# Patient Record
Sex: FEMALE | Race: WHITE | Employment: FULL TIME | URBAN - METROPOLITAN AREA
[De-identification: names, ages, dates, MRNs, and addresses within clinical notes are randomized per-mention and may not be internally consistent; named-entity substitution may affect disease eponyms.]

---

## 2019-10-31 ENCOUNTER — HOSPITAL ENCOUNTER (OUTPATIENT)
Dept: INTERVENTIONAL RADIOLOGY/VASCULAR | Age: 34
Discharge: HOME | End: 2019-10-31
Attending: NEUROLOGICAL SURGERY
Payer: COMMERCIAL

## 2019-10-31 VITALS
RESPIRATION RATE: 16 BRPM | HEART RATE: 101 BPM | BODY MASS INDEX: 48.82 KG/M2 | HEIGHT: 65 IN | DIASTOLIC BLOOD PRESSURE: 73 MMHG | SYSTOLIC BLOOD PRESSURE: 119 MMHG | TEMPERATURE: 98.7 F | WEIGHT: 293 LBS

## 2019-10-31 DIAGNOSIS — G93.2 BENIGN INTRACRANIAL HYPERTENSION: ICD-10-CM

## 2019-10-31 LAB
APPEARANCE FLD: NORMAL
COLOR FLD: NORMAL
GLUCOSE CSF-MCNC: 57 MG/DL (ref 40–70)
NUC CELL # FLD: 9 /CU MM
PROT CSF-MCNC: 38 MG/DL (ref 15–45)
RBC # FLD: 1661 /CU MM
SPECIMEN SOURCE FLD: NORMAL
TUBE # CSF: NORMAL
TUBE # CSF: NORMAL

## 2019-10-31 PROCEDURE — 89050 BODY FLUID CELL COUNT: CPT

## 2019-10-31 PROCEDURE — 77030003666 HC NDL SPINAL BD -A

## 2019-10-31 PROCEDURE — 84157 ASSAY OF PROTEIN OTHER: CPT

## 2019-10-31 PROCEDURE — 62270 DX LMBR SPI PNXR: CPT

## 2019-10-31 PROCEDURE — 74011000250 HC RX REV CODE- 250: Performed by: RADIOLOGY

## 2019-10-31 PROCEDURE — 82945 GLUCOSE OTHER FLUID: CPT

## 2019-10-31 PROCEDURE — 77030014143 HC TY PUNC LUMBR BD -A

## 2019-10-31 RX ORDER — LISINOPRIL AND HYDROCHLOROTHIAZIDE 10; 12.5 MG/1; MG/1
1 TABLET ORAL DAILY
COMMUNITY

## 2019-10-31 RX ORDER — LEVOCETIRIZINE DIHYDROCHLORIDE 5 MG/1
5 TABLET, FILM COATED ORAL DAILY
COMMUNITY

## 2019-10-31 RX ORDER — CHOLECALCIFEROL (VITAMIN D3) 125 MCG
2000 CAPSULE ORAL DAILY
COMMUNITY

## 2019-10-31 RX ORDER — LEVOTHYROXINE SODIUM 25 UG/1
25 TABLET ORAL
COMMUNITY

## 2019-10-31 RX ORDER — MONTELUKAST SODIUM 10 MG/1
10 TABLET ORAL DAILY
COMMUNITY

## 2019-10-31 RX ORDER — FLUTICASONE PROPIONATE 50 MCG
2 SPRAY, SUSPENSION (ML) NASAL DAILY
COMMUNITY

## 2019-10-31 RX ORDER — LIDOCAINE HYDROCHLORIDE 20 MG/ML
20-200 INJECTION, SOLUTION INFILTRATION; PERINEURAL ONCE
Status: COMPLETED | OUTPATIENT
Start: 2019-10-31 | End: 2019-10-31

## 2019-10-31 RX ORDER — AZELASTINE HCL 205.5 UG/1
1 SPRAY NASAL 2 TIMES DAILY
COMMUNITY

## 2019-10-31 RX ORDER — RANITIDINE 150 MG/1
150 TABLET, FILM COATED ORAL 2 TIMES DAILY
COMMUNITY
End: 2020-05-26

## 2019-10-31 RX ORDER — SPIRONOLACTONE 100 MG/1
50 TABLET, FILM COATED ORAL DAILY
COMMUNITY
End: 2019-11-13

## 2019-10-31 RX ORDER — BUPROPION HYDROCHLORIDE 75 MG/1
75 TABLET ORAL DAILY
COMMUNITY

## 2019-10-31 RX ORDER — BUTALBITAL, ACETAMINOPHEN, CAFFEINE AND CODEINE PHOSPHATE 50; 325; 40; 30 MG/1; MG/1; MG/1; MG/1
1 CAPSULE ORAL
COMMUNITY
End: 2019-11-13

## 2019-10-31 RX ORDER — DULOXETIN HYDROCHLORIDE 20 MG/1
20 CAPSULE, DELAYED RELEASE ORAL DAILY
COMMUNITY

## 2019-10-31 RX ADMIN — LIDOCAINE HYDROCHLORIDE 200 MG: 20 INJECTION, SOLUTION INFILTRATION; PERINEURAL at 08:24

## 2019-10-31 NOTE — PROGRESS NOTES
IR Nurse Pre-Procedure Checklist Part 2    Patient in IR suite at 1332      Consent signed: Yes    H&P complete:  Not applicable    Antibiotics: Not applicable    Airway/Mallampati Done: Not applicable    Shaved: Not applicable    Pregnancy Form:Yes    Patient Position: Yes    MD Side: Yes     Biopsy Worksheet: Yes    Specimen Medium: Not applicable

## 2019-10-31 NOTE — DISCHARGE INSTRUCTIONS
[address]  Department of Interventional Radiology  [facility]  [phone] Office  [phone] Fax  POST LUMBAR PUNCTURE/MYELOGRAM/INTRATHECAL CHEMOTHERAPY DISCHARGE INSTRUCTIONS  General Information:  Lumbar Puncture: A LP is done to help diagnose several disorders, like pseudo tumor, migraines, meningitis, and multiple sclerosis. It involves a puncture (usually in the lower spine) into the sac that protects the spinal column. A sample of the fluid in that space is removed and tested in the lab. Myelogram:   A Myelogram involves a lumbar puncture, and instead of removing fluid, contrast will be injected into the sac surrounding the spinal column. It is done to visualize the spinal column, nerve roots, spinal canal, vertebral discs and disc space. It is usually done to diagnose back pain with unknown cause or in preparation for surgery. After the injection, a CT scan will be done, usually within two hours of the injection. Intrathecal Chemotherapy:   Chemotherapy can be given in many forms. Intrathecal chemo involves a lumbar puncture, and instead of removing fluid, the chemo will be injected into the space. After any of these procedures, you will be asked to lie flat on your back for 4-6 hours to prevent complications. You should also rest for 24 hours after you go home, and force fluids. If you have a headache, you should take Tylenol or acetaminophen. Call If:   You should call your Physician and/or the Radiology Nurse if you develop a headache that is not relieved by Tylenol, and worsens when you stand and eases when you lie down, you need to call. You may have developed what is referred to as a spinal headache. Our physicians will probably advise you to be on strict bed rest for 24 hours, to drink lots of fluids and caffeine. If this does not help the head pain, call again the next day.  You should call if you have bleeding other than a small spot on your bandage. You should call if you have any numbness, tingling, weakness, fever, chills, urinary retention, severe itching, rash, welts, swelling, or confusion. Interventional Radiology General Nurse Discharge    After general anesthesia or intravenous sedation, for 24 hours or while taking prescription Narcotics:  · Limit your activities  · Do not drive and operate hazardous machinery  · Do not make important personal or business decisions  · Do  not drink alcoholic beverages  · If you have not urinated within 8 hours after discharge, please contact your surgeon on call. * Please give a list of your current medications to your Primary Care Provider. * Please update this list whenever your medications are discontinued, doses are     changed, or new medications (including over-the-counter products) are added. * Please carry medication information at all times in case of emergency situations. These are general instructions for a healthy lifestyle:    No smoking/ No tobacco products/ Avoid exposure to second hand smoke  Surgeon General's Warning:  Quitting smoking now greatly reduces serious risk to your health. Obesity, smoking, and sedentary lifestyle greatly increases your risk for illness  A healthy diet, regular physical exercise & weight monitoring are important for maintaining a healthy lifestyle    You may be retaining fluid if you have a history of heart failure or if you experience any of the following symptoms:  Weight gain of 3 pounds or more overnight or 5 pounds in a week, increased swelling in our hands or feet or shortness of breath while lying flat in bed. Please call your doctor as soon as you notice any of these symptoms; do not wait until your next office visit.     Recognize signs and symptoms of STROKE:  F-face looks uneven    A-arms unable to move or move unevenly    S-speech slurred or non-existent    T-time-call 911 as soon as signs and symptoms begin-DO NOT go       Back to bed or wait to see if you get better-TIME IS BRAIN. Follow-up Instructions: See the doctor who ordered your procedure as he/she has instructed. If you had a Lumbar Puncture or Myelogram, your results should be available to your ordering doctor in 3-5 business days. You can remove your dressing in 24 hours and shower regularly. Do not bathe or swim for 72 hours. To Reach Us: If you have any questions about your procedure, please call the Interventional Radiology department at [phone]. After business hours (5pm) and weekends, call the answering service at [phone] and ask for the Radiologist on call to be paged. Si tiene Preguntas acerca del procedimiento, por favor llame al departamento de Radiología Intervencional al [phone]. Después de horas de oficina (5 pm) y los fines de [place], llamar al Timothy Chawla al [phone] y pregunte por el Radiologo de [facility].      Patient Signature:  Date: 10/31/2019  Discharging Nurse: Jonathon Boyd RN

## 2019-10-31 NOTE — PROGRESS NOTES
Interventional Radiology Lumbar Puncture Note    10/31/2019    Procedure(s): Fluoroscopic guided diagnostic lumbar puncture performed at L3-L4 Approximately 22 cc of fluid. Opening pressures 27cm H2O. Closing pressure less than 15 cm of H20    CSF samples sent to lab. Complications: None    Plan: Observation,  bed rest for 2 hours. Patient to remain recumbent for the remainder of today.     Labs Pending:    Full dictated report to follow

## 2019-11-13 PROBLEM — E66.01 OBESITY, MORBID (HCC): Status: ACTIVE | Noted: 2019-11-13

## 2021-01-08 ENCOUNTER — HOSPITAL ENCOUNTER (OUTPATIENT)
Dept: GENERAL RADIOLOGY | Age: 36
Discharge: HOME | End: 2021-01-08
Payer: COMMERCIAL

## 2021-01-08 DIAGNOSIS — M25.69 BACK STIFFNESS: ICD-10-CM

## 2021-01-08 PROCEDURE — 72070 X-RAY EXAM THORAC SPINE 2VWS: CPT

## 2021-01-08 PROCEDURE — 72202 X-RAY EXAM SI JOINTS 3/> VWS: CPT

## 2022-03-18 PROBLEM — E66.01 OBESITY, MORBID (HCC): Status: ACTIVE | Noted: 2019-11-13

## 2022-09-08 ENCOUNTER — TELEMEDICINE (OUTPATIENT)
Dept: NEUROLOGY | Age: 37
End: 2022-09-08
Payer: COMMERCIAL

## 2022-09-08 DIAGNOSIS — G43.709 CHRONIC MIGRAINE WITHOUT AURA WITHOUT STATUS MIGRAINOSUS, NOT INTRACTABLE: ICD-10-CM

## 2022-09-08 DIAGNOSIS — G93.2 PSEUDOTUMOR CEREBRI: Primary | ICD-10-CM

## 2022-09-08 PROCEDURE — 99214 OFFICE O/P EST MOD 30 MIN: CPT | Performed by: PSYCHIATRY & NEUROLOGY

## 2022-09-08 ASSESSMENT — ENCOUNTER SYMPTOMS
GASTROINTESTINAL NEGATIVE: 1
RESPIRATORY NEGATIVE: 1

## 2022-09-08 NOTE — PROGRESS NOTES
[address], [address]  [place], [address]  Phone: [phone] Fax [phone]  Dr. Estella Ackerman was in the office While conducting this encounter. She and/ or her healthcare decision maker is aware that this patient-initialed Telehealth encounter is a billable service with coverage as determined by her insurance carrier. She is aware that she may receive a bill and has provided verbal consent to proceed: Yes    The Virtual visit was conducted telephone encounter only. -  I affirm this is a Patient Initiated Episode with an Established Patient who has not had a related appointment within my department in the past 7 days or scheduled within the next 24 hours. Note: this encounter is not billable if this call serves to triage the patient into an appointment for the relevant concern. Total Time:  minutes: 30-39 minutes        9/8/2022  Romiekita Cotter     Patient is referred by the following provider for consultation regarding as below:       I reviewed the available records and notes and have examined patient with the following findings:     Chief Complaint:  No chief complaint on file. HPI: This is a right handed 30 y.o.  female is very pleasant very appropriate patient who unfortunately in September 2009 was diagnosed with pseudotumor cerebri with headaches and visual changes her ophthalmologist noted papilledema. We went ahead with a spinal tap opening pressure was 27 it was dropped appropriately and she had great relief. We started Diamox with bad side effects phenacemide with bad side effects and she ended up on Neptazane 25 mg 3 a day with an occasional fourth if she starts flaring up with a headache. She just was not feeling very ago she was weak and kind of groggy she went to see her rheumatologist and her blood pressure was 60/40 so they held Neptazane order and then dropped it down to 1 a day for the last week or 2.   Then her primary doctor discontinued her lisinopril and her blood pressure went back up 119/80 and is asymptomatic. At this point I do think she needs to increase her Neptazane back up. Her next ophthalmologic evaluation at [facility] for neuro-ophthalmology is in the next couple months. And she is headache free at this moment. And no significant visual changes at this moment. We had a lengthy conversation about MRV's and sagittal sinus thrombosis is that she has not been checked for but if she flares up we will check her with an MRV. She also has chronic headaches on a regular basis that about 1 or 2/month and Maxalt 10 mg MLT's does help her significantly. Her last evaluation by neuro-ophthalmology to [place] [id number] did showed elevations in the optic disc but no swelling. They did agree with Neptazane. IMAGING REVIEW:  I REVIEWED PERTINENT  IMAGES AND REPORTS WITH THE PATIENT PERSONALLY, DIRECTLY AND FULLY. Past Medical History:  Past Medical History:   Diagnosis Date    Anxiety     Atopic rhinitis     Daytime somnolence     Depression     Hypertension     Hypothyroidism     Intracranial hypertension, benign     Migraines     Morbid obesity ([address]     Polyarthralgia     Sleep apnea     Vitamin D insufficiency        Past Surgical History:  Past Surgical History:   Procedure Laterality Date    ADENOIDECTOMY      GASTRIC BYPASS      Gastric Sleeve.     TONSILLECTOMY      WISDOM TOOTH EXTRACTION         Social History:  Social History     Socioeconomic History    Marital status: Single     Spouse name: Not on file    Number of children: Not on file    Years of education: Not on file    Highest education level: Not on file   Occupational History    Not on file   Tobacco Use    Smoking status: Never    Smokeless tobacco: Never   Substance and Sexual Activity    Alcohol use: Not Currently    Drug use: Not on file    Sexual activity: Not on file   Other Topics Concern    Not on file   Social History Narrative    Not on file     Social Determinants of Health     Financial Resource Strain: Not on file   Food Insecurity: Not on file   Transportation Needs: Not on file   Physical Activity: Not on file   Stress: Not on file   Social Connections: Not on file   Intimate Partner Violence: Not on file   Housing Stability: Not on file       Family History:   Family History   Problem Relation Age of Onset    Cancer Maternal Grandfather     Migraines Mother     Stroke Mother     High Cholesterol Mother     Asthma Mother     Hypertension Mother     Heart Disease Mother     Osteoarthritis Mother     Rheum Arthritis Maternal Grandmother     Glaucoma Maternal Grandmother     Macular Degen Maternal Grandmother     Cancer Maternal Grandmother         Melonoma. COPD Maternal Grandfather     Thyroid Disease Mother     Alzheimer's Disease Maternal Grandfather     Heart Disease Maternal Grandfather     Pacemaker Maternal Grandfather     Diabetes Father     Alcohol Abuse Father     Cancer Mother     Diabetes Mother        Current Outpatient Medications on File Prior to Visit   Medication Sig Dispense Refill    azelastine HCl 0.15 % SOLN 1 spray by Nasal route 2 times daily      buPROPion (WELLBUTRIN) 75 MG tablet Take 75 mg by mouth daily      butalbital-aspirin-caffeine (FIORINAL) -40 MG capsule Take 1 capsule by mouth every 4 hours as needed.       Cholecalciferol 50 MCG (2000 UT) TABS Take 2,000 Units by mouth daily      DULoxetine (CYMBALTA) 20 MG extended release capsule Take 20 mg by mouth daily      fluticasone (FLONASE) 50 MCG/ACT nasal spray 2 sprays by Nasal route daily      levocetirizine (XYZAL) 5 MG tablet Take 5 mg by mouth daily      levothyroxine (SYNTHROID) 25 MCG tablet Take 25 mcg by mouth every morning (before breakfast)      lisinopril-hydroCHLOROthiazide (PRINZIDE;ZESTORETIC) 10-12.5 MG per tablet Take 1 tablet by mouth daily      methazolAMIDE (NEPTAZANE) 25 MG tablet Take 25 mg by mouth 4 times daily      montelukast (SINGULAIR) 10 MG tablet Take 10 mg by mouth daily       No current facility-administered medications on file prior to visit. Not on File    Review of Systems:  Review of Systems   Constitutional: Negative. HENT: Negative. Eyes:  Positive for visual disturbance. Respiratory: Negative. Cardiovascular: Negative. Gastrointestinal: Negative. Endocrine: Negative. Genitourinary: Negative. Musculoskeletal: Negative. Skin: Negative. Neurological:  Positive for headaches. Hematological: Negative. Psychiatric/Behavioral: Negative. No flowsheet data found. No flowsheet data found. There were no vitals filed for this visit. Physical Exam  Eyes:      Extraocular Movements: Extraocular movements intact. Abdominal:      General: Abdomen is flat. Neurological:      Mental Status: She is alert and oriented to person, place, and time. Neurologic Exam     Mental Status   Oriented to person, place, and time. Attention: normal. Concentration: normal.   Level of consciousness: alert  Knowledge: good. Speech is clear and concise. Assessment   Assessment / Plan:    Diagnoses and all orders for this visit:    Pseudotumor cerebri at this time she lowered her Neptazane to 25 mg tablets only 1 a day but have asked her to go back up to 2 a day and if she can tolerate it go up to 3 a day with her blood pressure she usually is on 3 a day and sometimes an extra fourth if her headaches flareup. This is for pseudotumor. If her headaches get really aggressive she knows to contact me and we can get a spinal tap done. But she seems to have done very well with Neptazane 25 mg 3 a day sometimes 4. If she flares up we will also get an MRV looking for sagittal sinus thrombosis which I discussed with her. She has not had this procedure done.     Chronic migraine without aura without status migrainosus, not intractable she gets maybe 1 or 2/month and Maxalt 10 mg MLT's absolutely breaks the headaches. The Diagnosis and differential diagnostic considerations, and Rx Tx were reviewed with the patient at length. No orders of the defined types were placed in this encounter. I have spent greater than 50% of visit discussing and counseling of patient  for treatment and diagnostic plan review. Total time 30 min     . Notes: Patient is to continue all medications as directed by prescribing physicians. Continuations on today's visit are made based on the patient's report of current medications.              Dr. Americo Mcclain  Consultation Neurology, Neurodiagnostics and Neurotherapeutics  Neuroelectrophysiology, EEG, EMG  [facility]  [address] [place] [address], [address] [facility]  Phone:  [phone]  Fax:   [phone]

## 2023-01-19 ENCOUNTER — CLINICAL DOCUMENTATION (OUTPATIENT)
Dept: NEUROLOGY | Age: 38
End: 2023-01-19

## 2023-07-17 RX ORDER — RIZATRIPTAN BENZOATE 10 MG/1
TABLET, ORALLY DISINTEGRATING ORAL
COMMUNITY
Start: 2022-04-01 | End: 2023-07-17 | Stop reason: SDUPTHER

## 2023-07-17 RX ORDER — METHAZOLAMIDE 25 MG/1
25 TABLET ORAL 4 TIMES DAILY
Qty: 120 TABLET | Refills: 5 | Status: SHIPPED | OUTPATIENT
Start: 2023-07-17

## 2023-07-17 RX ORDER — RIZATRIPTAN BENZOATE 10 MG/1
TABLET, ORALLY DISINTEGRATING ORAL
Qty: 30 TABLET | OUTPATIENT
Start: 2023-07-17

## 2023-07-17 RX ORDER — METHAZOLAMIDE 25 MG/1
25 TABLET ORAL 4 TIMES DAILY
Qty: 120 TABLET | Refills: 11 | OUTPATIENT
Start: 2023-07-17

## 2023-07-17 RX ORDER — RIZATRIPTAN BENZOATE 10 MG/1
TABLET, ORALLY DISINTEGRATING ORAL
Qty: 12 TABLET | Refills: 11 | Status: SHIPPED | OUTPATIENT
Start: 2023-07-17

## 2023-07-17 NOTE — TELEPHONE ENCOUNTER
Pt requests refills of:  methazolAMIDE (NEPTAZANE) 25 MG tablet    rizatriptan (MAXALT-MLT) 10 MG disintegrating tablet    Mentions she does not have a follow up until January but needs these refilled

## 2023-07-17 NOTE — TELEPHONE ENCOUNTER
All the prescriptions that are impossible to sign that should typically be able to be signed I am going to send towards you so you know how many are being repeated after they are being typed in for no particular reason.     Basically a minute show each time we are doubling the work

## 2024-01-23 ENCOUNTER — TELEMEDICINE (OUTPATIENT)
Dept: NEUROLOGY | Age: 39
End: 2024-01-23
Payer: COMMERCIAL

## 2024-01-23 DIAGNOSIS — G43.709 CHRONIC MIGRAINE WITHOUT AURA WITHOUT STATUS MIGRAINOSUS, NOT INTRACTABLE: ICD-10-CM

## 2024-01-23 DIAGNOSIS — G93.2 PSEUDOTUMOR CEREBRI: Primary | ICD-10-CM

## 2024-01-23 PROCEDURE — 99213 OFFICE O/P EST LOW 20 MIN: CPT | Performed by: PSYCHIATRY & NEUROLOGY

## 2024-01-23 RX ORDER — RIZATRIPTAN BENZOATE 10 MG/1
TABLET, ORALLY DISINTEGRATING ORAL
Qty: 12 TABLET | Refills: 11 | Status: SHIPPED | OUTPATIENT
Start: 2024-01-23

## 2024-01-23 RX ORDER — METHAZOLAMIDE 25 MG/1
25 TABLET ORAL 4 TIMES DAILY
Qty: 120 TABLET | Refills: 5 | Status: SHIPPED | OUTPATIENT
Start: 2024-01-23

## 2024-01-23 ASSESSMENT — ENCOUNTER SYMPTOMS
GASTROINTESTINAL NEGATIVE: 1
RESPIRATORY NEGATIVE: 1
ALLERGIC/IMMUNOLOGIC NEGATIVE: 1

## 2024-01-23 NOTE — PROGRESS NOTES
Physical Activity: Not on file   Stress: Not on file   Social Connections: Not on file   Intimate Partner Violence: Not on file   Housing Stability: Not on file       Family History:   Family History   Problem Relation Age of Onset    Cancer Maternal Grandfather     Migraines Mother     Stroke Mother     High Cholesterol Mother     Asthma Mother     Hypertension Mother     Heart Disease Mother     Osteoarthritis Mother     Rheum Arthritis Maternal Grandmother     Glaucoma Maternal Grandmother     Macular Degen Maternal Grandmother     Cancer Maternal Grandmother         Melonoma.    COPD Maternal Grandfather     Thyroid Disease Mother     Alzheimer's Disease Maternal Grandfather     Heart Disease Maternal Grandfather     Pacemaker Maternal Grandfather     Diabetes Father     Alcohol Abuse Father     Cancer Mother     Diabetes Mother        Current Outpatient Medications on File Prior to Visit   Medication Sig Dispense Refill    azelastine HCl 0.15 % SOLN 1 spray by Nasal route 2 times daily      buPROPion (WELLBUTRIN) 75 MG tablet Take 75 mg by mouth daily      butalbital-aspirin-caffeine (FIORINAL) -40 MG capsule Take 1 capsule by mouth every 4 hours as needed.      Cholecalciferol 50 MCG (2000 UT) TABS Take 2,000 Units by mouth daily      DULoxetine (CYMBALTA) 20 MG extended release capsule Take 20 mg by mouth daily      fluticasone (FLONASE) 50 MCG/ACT nasal spray 2 sprays by Nasal route daily      levocetirizine (XYZAL) 5 MG tablet Take 5 mg by mouth daily      levothyroxine (SYNTHROID) 25 MCG tablet Take 25 mcg by mouth every morning (before breakfast)      lisinopril-hydroCHLOROthiazide (PRINZIDE;ZESTORETIC) 10-12.5 MG per tablet Take 1 tablet by mouth daily      montelukast (SINGULAIR) 10 MG tablet Take 10 mg by mouth daily       No current facility-administered medications on file prior to visit.       Not on File    Review of Systems:  Review of Systems   Eyes:  Positive for visual disturbance.

## 2025-01-31 ENCOUNTER — TELEMEDICINE (OUTPATIENT)
Dept: NEUROLOGY | Age: 40
End: 2025-01-31
Payer: COMMERCIAL

## 2025-01-31 DIAGNOSIS — G43.709 CHRONIC MIGRAINE WITHOUT AURA WITHOUT STATUS MIGRAINOSUS, NOT INTRACTABLE: ICD-10-CM

## 2025-01-31 DIAGNOSIS — G93.2 IIH (IDIOPATHIC INTRACRANIAL HYPERTENSION): Primary | ICD-10-CM

## 2025-01-31 PROCEDURE — 99214 OFFICE O/P EST MOD 30 MIN: CPT | Performed by: PSYCHIATRY & NEUROLOGY

## 2025-01-31 RX ORDER — RIZATRIPTAN BENZOATE 10 MG/1
TABLET, ORALLY DISINTEGRATING ORAL
Qty: 12 TABLET | Refills: 11 | Status: SHIPPED | OUTPATIENT
Start: 2025-01-31

## 2025-01-31 RX ORDER — METHAZOLAMIDE 25 MG/1
25 TABLET ORAL 4 TIMES DAILY
Qty: 120 TABLET | Refills: 5 | Status: SHIPPED | OUTPATIENT
Start: 2025-01-31

## 2025-01-31 ASSESSMENT — ENCOUNTER SYMPTOMS
GASTROINTESTINAL NEGATIVE: 1
RESPIRATORY NEGATIVE: 1
ALLERGIC/IMMUNOLOGIC NEGATIVE: 1

## 2025-01-31 NOTE — PROGRESS NOTES
[facility]  [address] [place] [address], [address]  [place] [address]  Phone: [phone] Fax [phone]  Dr. Jamarcus Stephens I was in the office While conducting this encounter.    She and/ or her healthcare decision maker is aware that this patient-initialed Telehealth encounter is a billable service with coverage as determined by her insurance carrier.  She is aware that she may receive a bill and has provided verbal consent to proceed: Yes    The Virtual visit was conducted via [employer]. Pursuant to the emergency declaration under the [place] Act and the National Emergencies Act, 1135 waiver authority and the Coronavirus Preparedness and Response Supplemental Appropriations Act, this Virtual  Visit was conducted to reduce the patient's risk of exposure to COVID-19 and provide continuity of care for an established patient. Services were provided through a video synchronous discussion virtually to substitute for in-person clinic visit.  Due to this being a TeleHealth evaluation, many elements of the physical examination are unable to be assessed.     Total Time:  minutes: 21-30 minutes        1/31/2025  Christina Clayton     Patient is referred by the following provider for consultation regarding as below:       I reviewed the available records and notes and have examined patient with the following findings:     Chief Complaint:  No chief complaint on file.         HPI: This is a right handed 39 y.o. Single female very pleasant very appropriate patient were seen virtually.  The patient unfortunately does have idiopathic intracranial hypertension/pseudotumor cerebri.  In 2009 she had headaches and visual changes and they identified open pressures at her optic nerve.  Spinal tap showed opening pressure of 27 which is abnormal.  She was started on Diamox and switched to Neptazane 25 mg taking at the moment sometimes 1 twice a day sometimes 2 twice a day but she can go up to 4-day.  She has recently had a